# Patient Record
Sex: FEMALE | Race: WHITE | NOT HISPANIC OR LATINO | Employment: OTHER | URBAN - METROPOLITAN AREA
[De-identification: names, ages, dates, MRNs, and addresses within clinical notes are randomized per-mention and may not be internally consistent; named-entity substitution may affect disease eponyms.]

---

## 2017-06-06 ENCOUNTER — PREPPED CHART (OUTPATIENT)
Dept: URBAN - METROPOLITAN AREA CLINIC 43 | Facility: CLINIC | Age: 77
End: 2017-06-06

## 2017-06-21 NOTE — PATIENT DISCUSSION
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 Pt out of Pred. - Plan : Start Prednisolone 1% qid OD for 1 week, then tid OD for 1 week, then bid OD for 1 week, then qdaily for 1 week, then stop. E-Rx with refills sent to pharmacy. Pt to call is symptoms worsen, do not improve, or new symptoms or vision changes occur. RTC in 2-3 months for Exam, sooner if problems.

## 2018-06-11 NOTE — PATIENT DISCUSSION
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 1+ Cell, 2+ Flare, 4+ depth today. - Plan : begin Prednisolone 1% OD QID for 1 Week, then OD TID x 1 week, then OD BID x 1 week, the OD QD x 1 week, then stop. E-Rx with refills sent to pharmacy. Pt to call is symptoms worsen, do not improve, or new symptoms or vision changes occur. RV in 1-2 Months for Complete Exam, sooner with problems or changes in vision.

## 2018-06-21 ENCOUNTER — ESTABLISHED COMPREHENSIVE EXAM (OUTPATIENT)
Dept: URBAN - METROPOLITAN AREA CLINIC 43 | Facility: CLINIC | Age: 78
End: 2018-06-21

## 2018-06-21 DIAGNOSIS — H25.811: ICD-10-CM

## 2018-06-21 DIAGNOSIS — H40.013: ICD-10-CM

## 2018-06-21 DIAGNOSIS — H40.033: ICD-10-CM

## 2018-06-21 DIAGNOSIS — H04.123: ICD-10-CM

## 2018-06-21 DIAGNOSIS — H25.812: ICD-10-CM

## 2018-06-21 PROCEDURE — G8428 CUR MEDS NOT DOCUMENT: HCPCS

## 2018-06-21 PROCEDURE — 92250 FUNDUS PHOTOGRAPHY W/I&R: CPT

## 2018-06-21 PROCEDURE — G8756 NO BP MEASURE DOC: HCPCS

## 2018-06-21 PROCEDURE — 9222650 BILAT EXTENDED OPHTHALMOSCOPY, F/U

## 2018-06-21 PROCEDURE — 92014 COMPRE OPH EXAM EST PT 1/>: CPT

## 2018-06-21 PROCEDURE — 92015 DETERMINE REFRACTIVE STATE: CPT

## 2018-06-21 ASSESSMENT — TONOMETRY
OS_IOP_MMHG: 17
OD_IOP_MMHG: 17

## 2018-06-21 ASSESSMENT — VISUAL ACUITY
OD_BAT: 20/70
OS_BAT: 20/50
OS_SC: 20/30-2
OD_SC: J1+
OD_SC: 20/200
OS_SC: J12

## 2018-08-16 ENCOUNTER — CATARACT CONSULT (OUTPATIENT)
Dept: URBAN - METROPOLITAN AREA CLINIC 43 | Facility: CLINIC | Age: 78
End: 2018-08-16

## 2018-08-16 VITALS
HEART RATE: 72 BPM | RESPIRATION RATE: 16 BRPM | SYSTOLIC BLOOD PRESSURE: 123 MMHG | DIASTOLIC BLOOD PRESSURE: 62 MMHG | HEIGHT: 55 IN

## 2018-08-16 DIAGNOSIS — H40.013: ICD-10-CM

## 2018-08-16 DIAGNOSIS — I10: ICD-10-CM

## 2018-08-16 DIAGNOSIS — H25.812: ICD-10-CM

## 2018-08-16 DIAGNOSIS — H25.811: ICD-10-CM

## 2018-08-16 DIAGNOSIS — H35.352: ICD-10-CM

## 2018-08-16 DIAGNOSIS — H18.51: ICD-10-CM

## 2018-08-16 DIAGNOSIS — H04.123: ICD-10-CM

## 2018-08-16 PROCEDURE — 92136TC INTERFEROMETRY - TECHNICAL COMPONENT

## 2018-08-16 PROCEDURE — 92014 COMPRE OPH EXAM EST PT 1/>: CPT

## 2018-08-16 PROCEDURE — G8752 SYS BP LESS 140: HCPCS

## 2018-08-16 PROCEDURE — 92286 ANT SGM IMG I&R SPECLR MIC: CPT

## 2018-08-16 PROCEDURE — G8428 CUR MEDS NOT DOCUMENT: HCPCS

## 2018-08-16 PROCEDURE — 92134 CPTRZ OPH DX IMG PST SGM RTA: CPT

## 2018-08-16 PROCEDURE — G8754 DIAS BP LESS 90: HCPCS

## 2018-08-16 ASSESSMENT — TONOMETRY
OS_IOP_MMHG: 18
OD_IOP_MMHG: 17

## 2018-08-16 ASSESSMENT — VISUAL ACUITY
OS_SC: 20/40-2
OD_CC: J1
OD_SC: J1
OS_BAT: 20/70
OS_CC: J1
OD_BAT: 20/80
OD_PH: 20/60+1
OD_PAM: 20/20-2
OS_SC: J12
OD_SC: 20/100
OS_AM: 20/20-2

## 2018-08-17 ENCOUNTER — CONSULT (OUTPATIENT)
Dept: URBAN - METROPOLITAN AREA CLINIC 43 | Facility: CLINIC | Age: 78
End: 2018-08-17

## 2018-08-17 VITALS — DIASTOLIC BLOOD PRESSURE: 66 MMHG | HEART RATE: 66 BPM | SYSTOLIC BLOOD PRESSURE: 113 MMHG | HEIGHT: 55 IN

## 2018-08-17 DIAGNOSIS — H35.363: ICD-10-CM

## 2018-08-17 DIAGNOSIS — H43.393: ICD-10-CM

## 2018-08-17 DIAGNOSIS — H35.352: ICD-10-CM

## 2018-08-17 DIAGNOSIS — H43.811: ICD-10-CM

## 2018-08-17 DIAGNOSIS — H43.812: ICD-10-CM

## 2018-08-17 PROCEDURE — 92014 COMPRE OPH EXAM EST PT 1/>: CPT

## 2018-08-17 PROCEDURE — G8752 SYS BP LESS 140: HCPCS

## 2018-08-17 PROCEDURE — 9222650 BILAT EXTENDED OPHTHALMOSCOPY, F/U

## 2018-08-17 PROCEDURE — 92134 CPTRZ OPH DX IMG PST SGM RTA: CPT

## 2018-08-17 PROCEDURE — G8754 DIAS BP LESS 90: HCPCS

## 2018-08-17 PROCEDURE — G8427 DOCREV CUR MEDS BY ELIG CLIN: HCPCS

## 2018-08-17 ASSESSMENT — VISUAL ACUITY
OS_SC: 20/30-2
OD_CC: J2
OD_PH: 20/60+2
OS_CC: J1
OD_SC: 20/200

## 2018-08-17 ASSESSMENT — TONOMETRY
OD_IOP_MMHG: 16
OS_IOP_MMHG: 17

## 2018-08-23 ENCOUNTER — TECH ONLY (OUTPATIENT)
Dept: URBAN - METROPOLITAN AREA CLINIC 43 | Facility: CLINIC | Age: 78
End: 2018-08-23

## 2018-08-23 DIAGNOSIS — H25.812: ICD-10-CM

## 2018-08-23 DIAGNOSIS — H25.811: ICD-10-CM

## 2018-08-23 PROCEDURE — 99211T TECH SERVICE

## 2018-11-02 NOTE — PATIENT DISCUSSION
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 No cell or flare today OU. - Plan : Patient has been using pred forte OD PRN. Likely  samples being used. Advised to D/C pred and being Alrex OD PRN with changes. RV to office with flare up or worsening symptoms. Rv in 1 Year for Complete Exam, sooner with problems or changes.

## 2018-11-02 NOTE — PATIENT DISCUSSION
(Z21.663) Vitreous degeneration, bilateral - Assesment : Examination revealed PVD OU. OS>OD. - Plan : Monitor for changes. Advised patient to call our office with decreased vision or an increase in flashes and/or floaters.

## 2018-11-09 ENCOUNTER — ESTABLISHED PATIENT (OUTPATIENT)
Dept: URBAN - METROPOLITAN AREA CLINIC 43 | Facility: CLINIC | Age: 78
End: 2018-11-09

## 2018-11-09 DIAGNOSIS — H35.363: ICD-10-CM

## 2018-11-09 DIAGNOSIS — H33.103: ICD-10-CM

## 2018-11-09 DIAGNOSIS — H35.352: ICD-10-CM

## 2018-11-09 PROCEDURE — 9222650 BILAT EXTENDED OPHTHALMOSCOPY, F/U

## 2018-11-09 PROCEDURE — 92014 COMPRE OPH EXAM EST PT 1/>: CPT

## 2018-11-09 PROCEDURE — 92134 CPTRZ OPH DX IMG PST SGM RTA: CPT

## 2018-11-09 PROCEDURE — G8756 NO BP MEASURE DOC: HCPCS

## 2018-11-09 PROCEDURE — G8427 DOCREV CUR MEDS BY ELIG CLIN: HCPCS

## 2018-11-09 ASSESSMENT — VISUAL ACUITY
OS_CC: 20/40+2
OD_CC: 20/80-2
OD_PH: 20/60-2

## 2018-11-09 ASSESSMENT — TONOMETRY
OS_IOP_MMHG: 16
OD_IOP_MMHG: 16

## 2019-03-15 ENCOUNTER — ESTABLISHED PATIENT (OUTPATIENT)
Dept: URBAN - METROPOLITAN AREA CLINIC 43 | Facility: CLINIC | Age: 79
End: 2019-03-15

## 2019-03-15 DIAGNOSIS — H35.352: ICD-10-CM

## 2019-03-15 DIAGNOSIS — H35.363: ICD-10-CM

## 2019-03-15 PROCEDURE — 92134 CPTRZ OPH DX IMG PST SGM RTA: CPT

## 2019-03-15 PROCEDURE — 92014 COMPRE OPH EXAM EST PT 1/>: CPT

## 2019-03-15 PROCEDURE — 9222650 BILAT EXTENDED OPHTHALMOSCOPY, F/U

## 2019-03-15 ASSESSMENT — VISUAL ACUITY
OD_SC: 20/200
OD_SC: J1
OS_SC: 20/30-1
OS_SC: J10

## 2019-03-15 ASSESSMENT — TONOMETRY
OD_IOP_MMHG: 16
OS_IOP_MMHG: 16

## 2019-03-28 ENCOUNTER — CATARACT EVALUATION (OUTPATIENT)
Dept: URBAN - METROPOLITAN AREA CLINIC 43 | Facility: CLINIC | Age: 79
End: 2019-03-28

## 2019-03-28 DIAGNOSIS — H25.811: ICD-10-CM

## 2019-03-28 DIAGNOSIS — H18.51: ICD-10-CM

## 2019-03-28 DIAGNOSIS — H25.812: ICD-10-CM

## 2019-03-28 PROCEDURE — 92025-2 CORNEAL TOPOGRAPHY, PT

## 2019-03-28 PROCEDURE — V2799I IMPRIMIS

## 2019-03-28 PROCEDURE — 92014 COMPRE OPH EXAM EST PT 1/>: CPT

## 2019-03-28 RX ORDER — NEPAFENAC 3 MG/ML: 1 SUSPENSION/ DROPS OPHTHALMIC ONCE A DAY

## 2019-03-28 ASSESSMENT — TONOMETRY
OD_IOP_MMHG: 16
OS_IOP_MMHG: 15

## 2019-03-28 ASSESSMENT — VISUAL ACUITY
OU_SC: 20/40
OD_PAM: 20/20
OS_CC: J1
OS_AM: 20/20
OD_RAM: 20/20
OD_BAT: <20/400
OS_SC: J12
OS_BAT: 20/50-1
OU_CC: J1
OS_SC: 20/50-2
OS_AM: 20/20
OD_CC: 20/50
OD_SC: 20/200
OD_CC: J1
OD_SC: J1

## 2019-04-16 ENCOUNTER — SURGERY/PROCEDURE (OUTPATIENT)
Dept: URBAN - METROPOLITAN AREA CLINIC 43 | Facility: CLINIC | Age: 79
End: 2019-04-16

## 2019-04-16 ENCOUNTER — PRE-OP/H&P (OUTPATIENT)
Dept: URBAN - METROPOLITAN AREA CLINIC 39 | Facility: CLINIC | Age: 79
End: 2019-04-16

## 2019-04-16 DIAGNOSIS — H25.811: ICD-10-CM

## 2019-04-16 DIAGNOSIS — H25.812: ICD-10-CM

## 2019-04-16 PROCEDURE — 66984CV REMOVE CATARACT, INSERT LENS, CUSTOM VISION

## 2019-04-16 PROCEDURE — 99211T TECH SERVICE

## 2019-04-16 PROCEDURE — 66999LNSR LENSAR LASER FOR CAT SX

## 2019-04-16 PROCEDURE — 65772LRI LRI DURING CAT SX

## 2019-04-17 ENCOUNTER — CATARACT POST-OP 1-DAY (OUTPATIENT)
Dept: URBAN - METROPOLITAN AREA CLINIC 43 | Facility: CLINIC | Age: 79
End: 2019-04-17

## 2019-04-17 DIAGNOSIS — Z96.1: ICD-10-CM

## 2019-04-17 PROCEDURE — 99024 POSTOP FOLLOW-UP VISIT: CPT

## 2019-04-17 ASSESSMENT — VISUAL ACUITY
OD_SC: J4-
OD_SC: 20/80

## 2019-04-17 ASSESSMENT — TONOMETRY: OD_IOP_MMHG: 17

## 2019-04-22 ENCOUNTER — SURGERY/PROCEDURE (OUTPATIENT)
Dept: URBAN - METROPOLITAN AREA CLINIC 43 | Facility: CLINIC | Age: 79
End: 2019-04-22

## 2019-04-22 ENCOUNTER — POST-OP CATARACT (OUTPATIENT)
Dept: URBAN - METROPOLITAN AREA CLINIC 39 | Facility: CLINIC | Age: 79
End: 2019-04-22

## 2019-04-22 DIAGNOSIS — H25.812: ICD-10-CM

## 2019-04-22 DIAGNOSIS — Z96.1: ICD-10-CM

## 2019-04-22 PROCEDURE — 66999LNSR LENSAR LASER FOR CAT SX

## 2019-04-22 PROCEDURE — 99211T TECH SERVICE

## 2019-04-22 PROCEDURE — 66984CV REMOVE CATARACT, INSERT LENS, CUSTOM VISION

## 2019-04-22 PROCEDURE — 65772LRI LRI DURING CAT SX

## 2019-04-22 ASSESSMENT — VISUAL ACUITY
OD_PH: 20/30
OD_SC: 20/200
OD_SC: J1

## 2019-04-23 ENCOUNTER — CATARACT POST-OP 1-DAY (OUTPATIENT)
Dept: URBAN - METROPOLITAN AREA CLINIC 43 | Facility: CLINIC | Age: 79
End: 2019-04-23

## 2019-04-23 DIAGNOSIS — Z96.1: ICD-10-CM

## 2019-04-23 PROCEDURE — 99024 POSTOP FOLLOW-UP VISIT: CPT

## 2019-04-23 ASSESSMENT — VISUAL ACUITY
OS_PH: 20/40-2
OS_SC: 20/60
OD_PH: 20/30+2
OD_SC: 20/70
OD_SC: J1

## 2019-04-23 ASSESSMENT — TONOMETRY
OS_IOP_MMHG: 15
OD_IOP_MMHG: 12

## 2019-05-13 ENCOUNTER — POST-OP CATARACT (OUTPATIENT)
Dept: URBAN - METROPOLITAN AREA CLINIC 43 | Facility: CLINIC | Age: 79
End: 2019-05-13

## 2019-05-13 DIAGNOSIS — Z96.1: ICD-10-CM

## 2019-05-13 PROCEDURE — 99024 POSTOP FOLLOW-UP VISIT: CPT

## 2019-05-13 ASSESSMENT — VISUAL ACUITY
OD_PH: 20/30-2
OS_SC: 20/30+2
OD_SC: 20/50
OS_SC: J3
OD_SC: J1

## 2019-05-13 ASSESSMENT — TONOMETRY
OS_IOP_MMHG: 13
OD_IOP_MMHG: 15

## 2019-09-10 ENCOUNTER — ESTABLISHED COMPREHENSIVE EXAM (OUTPATIENT)
Dept: URBAN - METROPOLITAN AREA CLINIC 43 | Facility: CLINIC | Age: 79
End: 2019-09-10

## 2019-09-10 DIAGNOSIS — H40.013: ICD-10-CM

## 2019-09-10 PROCEDURE — 92014 COMPRE OPH EXAM EST PT 1/>: CPT

## 2019-09-10 PROCEDURE — 92133 CPTRZD OPH DX IMG PST SGM ON: CPT

## 2019-09-10 ASSESSMENT — VISUAL ACUITY
OD_SC: J2
OS_SC: J3
OD_PH: 20/30-2
OS_SC: 20/30-2
OD_SC: 20/70-2

## 2019-09-10 ASSESSMENT — TONOMETRY
OS_IOP_MMHG: 13
OD_IOP_MMHG: 13

## 2019-09-19 ENCOUNTER — ESTABLISHED PATIENT (OUTPATIENT)
Dept: URBAN - METROPOLITAN AREA CLINIC 43 | Facility: CLINIC | Age: 79
End: 2019-09-19

## 2019-09-19 VITALS — HEIGHT: 55 IN | DIASTOLIC BLOOD PRESSURE: 64 MMHG | HEART RATE: 83 BPM | SYSTOLIC BLOOD PRESSURE: 122 MMHG

## 2019-09-19 DIAGNOSIS — H43.823: ICD-10-CM

## 2019-09-19 DIAGNOSIS — H43.812: ICD-10-CM

## 2019-09-19 DIAGNOSIS — H33.192: ICD-10-CM

## 2019-09-19 PROCEDURE — 92134 CPTRZ OPH DX IMG PST SGM RTA: CPT

## 2019-09-19 PROCEDURE — 92014 COMPRE OPH EXAM EST PT 1/>: CPT

## 2019-09-19 PROCEDURE — 9222650 BILAT EXTENDED OPHTHALMOSCOPY, F/U

## 2019-09-19 ASSESSMENT — VISUAL ACUITY
OD_CC: J1
OD_PH: 20/30
OS_CC: J1
OS_SC: 20/25-2
OD_SC: 20/60

## 2019-09-19 ASSESSMENT — TONOMETRY
OS_IOP_MMHG: 11
OD_IOP_MMHG: 10

## 2019-12-04 ENCOUNTER — EST. PATIENT EMERGENCY (OUTPATIENT)
Dept: URBAN - METROPOLITAN AREA CLINIC 43 | Facility: CLINIC | Age: 79
End: 2019-12-04

## 2019-12-04 DIAGNOSIS — H04.123: ICD-10-CM

## 2019-12-04 PROCEDURE — 92012 INTRM OPH EXAM EST PATIENT: CPT

## 2019-12-04 ASSESSMENT — VISUAL ACUITY
OD_SC: J1
OS_SC: 20/25+2
OD_SC: 20/70-2

## 2019-12-04 ASSESSMENT — TONOMETRY
OS_IOP_MMHG: 11
OD_IOP_MMHG: 11

## 2020-09-08 ENCOUNTER — ESTABLISHED COMPREHENSIVE EXAM (OUTPATIENT)
Dept: URBAN - METROPOLITAN AREA CLINIC 43 | Facility: CLINIC | Age: 80
End: 2020-09-08

## 2020-09-08 DIAGNOSIS — H04.123: ICD-10-CM

## 2020-09-08 DIAGNOSIS — H40.013: ICD-10-CM

## 2020-09-08 DIAGNOSIS — H40.033: ICD-10-CM

## 2020-09-08 PROCEDURE — 92014 COMPRE OPH EXAM EST PT 1/>: CPT

## 2020-09-08 PROCEDURE — 92250 FUNDUS PHOTOGRAPHY W/I&R: CPT

## 2020-09-08 ASSESSMENT — VISUAL ACUITY
OS_SC: 20/30
OS_CC: J1
OD_PH: 20/40-2
OD_CC: J1
OS_SC: J3
OD_SC: J1

## 2020-09-08 ASSESSMENT — TONOMETRY
OS_IOP_MMHG: 15
OD_IOP_MMHG: 16

## 2020-09-24 ENCOUNTER — ESTABLISHED COMPREHENSIVE EXAM (OUTPATIENT)
Dept: URBAN - METROPOLITAN AREA CLINIC 43 | Facility: CLINIC | Age: 80
End: 2020-09-24

## 2020-09-24 DIAGNOSIS — H33.311: ICD-10-CM

## 2020-09-24 DIAGNOSIS — H33.192: ICD-10-CM

## 2020-09-24 DIAGNOSIS — H35.371: ICD-10-CM

## 2020-09-24 DIAGNOSIS — H35.363: ICD-10-CM

## 2020-09-24 DIAGNOSIS — H43.813: ICD-10-CM

## 2020-09-24 DIAGNOSIS — I10: ICD-10-CM

## 2020-09-24 PROCEDURE — 92202 OPSCPY EXTND ON/MAC DRAW: CPT

## 2020-09-24 PROCEDURE — 92014 COMPRE OPH EXAM EST PT 1/>: CPT

## 2020-09-24 PROCEDURE — 92134 CPTRZ OPH DX IMG PST SGM RTA: CPT

## 2020-09-24 ASSESSMENT — TONOMETRY
OS_IOP_MMHG: 17
OD_IOP_MMHG: 16

## 2020-09-24 ASSESSMENT — VISUAL ACUITY
OD_SC: 20/60-2
OS_SC: 20/30
OD_PH: 20/40-1

## 2021-09-14 ENCOUNTER — ESTABLISHED COMPREHENSIVE EXAM (OUTPATIENT)
Dept: URBAN - METROPOLITAN AREA CLINIC 43 | Facility: CLINIC | Age: 81
End: 2021-09-14

## 2021-09-14 DIAGNOSIS — H43.813: ICD-10-CM

## 2021-09-14 DIAGNOSIS — H04.123: ICD-10-CM

## 2021-09-14 DIAGNOSIS — H35.371: ICD-10-CM

## 2021-09-14 DIAGNOSIS — H40.013: ICD-10-CM

## 2021-09-14 DIAGNOSIS — H35.363: ICD-10-CM

## 2021-09-14 PROCEDURE — 92014 COMPRE OPH EXAM EST PT 1/>: CPT

## 2021-09-14 PROCEDURE — 92133 CPTRZD OPH DX IMG PST SGM ON: CPT

## 2021-09-14 ASSESSMENT — VISUAL ACUITY
OS_SC: 20/50+2
OD_SC: 20/60-2
OD_SC: J2
OS_CC: J1
OD_CC: J1
OS_PH: 20/30-1
OS_SC: J10
OD_PH: 20/30

## 2021-09-14 ASSESSMENT — TONOMETRY
OS_IOP_MMHG: 16
OD_IOP_MMHG: 16

## 2021-09-20 ENCOUNTER — EST. PATIENT EMERGENCY (OUTPATIENT)
Dept: URBAN - METROPOLITAN AREA CLINIC 43 | Facility: CLINIC | Age: 81
End: 2021-09-20

## 2021-09-20 DIAGNOSIS — H11.32: ICD-10-CM

## 2021-09-20 PROCEDURE — 92012 INTRM OPH EXAM EST PATIENT: CPT

## 2021-09-20 ASSESSMENT — VISUAL ACUITY
OD_CC: J1
OD_SC: 20/50-1
OS_PH: 20/25-1
OS_SC: 20/50+1
OD_PH: 20/30
OS_CC: J1

## 2021-09-20 ASSESSMENT — TONOMETRY
OS_IOP_MMHG: 19
OD_IOP_MMHG: 15

## 2021-11-04 ENCOUNTER — ESTABLISHED COMPREHENSIVE EXAM (OUTPATIENT)
Dept: URBAN - METROPOLITAN AREA CLINIC 43 | Facility: CLINIC | Age: 81
End: 2021-11-04

## 2021-11-04 DIAGNOSIS — H35.371: ICD-10-CM

## 2021-11-04 DIAGNOSIS — I10: ICD-10-CM

## 2021-11-04 DIAGNOSIS — H40.013: ICD-10-CM

## 2021-11-04 DIAGNOSIS — H35.363: ICD-10-CM

## 2021-11-04 DIAGNOSIS — H43.813: ICD-10-CM

## 2021-11-04 PROCEDURE — 92014 COMPRE OPH EXAM EST PT 1/>: CPT

## 2021-11-04 PROCEDURE — 92134 CPTRZ OPH DX IMG PST SGM RTA: CPT

## 2021-11-04 PROCEDURE — 92202 OPSCPY EXTND ON/MAC DRAW: CPT

## 2021-11-04 ASSESSMENT — VISUAL ACUITY
OS_PH: 20/30+2
OD_SC: 20/60-1
OS_SC: 20/40-1
OD_PH: 20/30+2

## 2021-11-04 ASSESSMENT — TONOMETRY
OS_IOP_MMHG: 18
OD_IOP_MMHG: 16

## 2023-01-26 ENCOUNTER — COMPREHENSIVE EXAM (OUTPATIENT)
Dept: URBAN - METROPOLITAN AREA CLINIC 43 | Facility: CLINIC | Age: 83
End: 2023-01-26

## 2023-01-26 DIAGNOSIS — H35.363: ICD-10-CM

## 2023-01-26 DIAGNOSIS — H43.813: ICD-10-CM

## 2023-01-26 DIAGNOSIS — H40.013: ICD-10-CM

## 2023-01-26 DIAGNOSIS — H35.371: ICD-10-CM

## 2023-01-26 DIAGNOSIS — H04.123: ICD-10-CM

## 2023-01-26 PROCEDURE — 92014 COMPRE OPH EXAM EST PT 1/>: CPT

## 2023-01-26 PROCEDURE — 92133 CPTRZD OPH DX IMG PST SGM ON: CPT

## 2023-01-26 ASSESSMENT — VISUAL ACUITY
OU_SC: 20/30
OD_PH: 20/25-2
OD_SC: 20/60
OU_SC: J1
OD_SC: J2
OS_SC: 20/30-1
OS_SC: J12

## 2023-01-26 ASSESSMENT — TONOMETRY
OD_IOP_MMHG: 17
OS_IOP_MMHG: 16

## 2024-01-25 ENCOUNTER — COMPREHENSIVE EXAM (OUTPATIENT)
Dept: URBAN - METROPOLITAN AREA CLINIC 43 | Facility: CLINIC | Age: 84
End: 2024-01-25

## 2024-01-25 DIAGNOSIS — H40.013: ICD-10-CM

## 2024-01-25 DIAGNOSIS — H35.363: ICD-10-CM

## 2024-01-25 DIAGNOSIS — H04.123: ICD-10-CM

## 2024-01-25 DIAGNOSIS — H43.813: ICD-10-CM

## 2024-01-25 DIAGNOSIS — H35.371: ICD-10-CM

## 2024-01-25 PROCEDURE — 92014 COMPRE OPH EXAM EST PT 1/>: CPT

## 2024-01-25 PROCEDURE — 92134 CPTRZ OPH DX IMG PST SGM RTA: CPT

## 2024-01-25 ASSESSMENT — VISUAL ACUITY
OS_SC: 20/20-2
OD_SC: J2
OS_SC: J14
OD_SC: 20/70+2
OU_SC: 20/20-1
OU_SC: J1

## 2024-01-25 ASSESSMENT — TONOMETRY
OD_IOP_MMHG: 17
OS_IOP_MMHG: 17

## 2025-01-29 ENCOUNTER — COMPREHENSIVE EXAM (OUTPATIENT)
Age: 85
End: 2025-01-29

## 2025-01-29 DIAGNOSIS — H40.013: ICD-10-CM

## 2025-01-29 DIAGNOSIS — H35.371: ICD-10-CM

## 2025-01-29 DIAGNOSIS — H43.813: ICD-10-CM

## 2025-01-29 DIAGNOSIS — H04.123: ICD-10-CM

## 2025-01-29 DIAGNOSIS — H35.363: ICD-10-CM

## 2025-01-29 PROCEDURE — 92014 COMPRE OPH EXAM EST PT 1/>: CPT
